# Patient Record
Sex: MALE | Race: WHITE | Employment: OTHER | URBAN - NONMETROPOLITAN AREA
[De-identification: names, ages, dates, MRNs, and addresses within clinical notes are randomized per-mention and may not be internally consistent; named-entity substitution may affect disease eponyms.]

---

## 2024-07-17 ENCOUNTER — APPOINTMENT (OUTPATIENT)
Dept: CARDIOLOGY | Facility: CLINIC | Age: 64
End: 2024-07-17
Payer: COMMERCIAL

## 2024-07-17 VITALS
WEIGHT: 204 LBS | HEART RATE: 69 BPM | HEIGHT: 69 IN | DIASTOLIC BLOOD PRESSURE: 62 MMHG | BODY MASS INDEX: 30.21 KG/M2 | SYSTOLIC BLOOD PRESSURE: 120 MMHG

## 2024-07-17 DIAGNOSIS — E11.9 TYPE 2 DIABETES MELLITUS WITHOUT COMPLICATION, UNSPECIFIED WHETHER LONG TERM INSULIN USE (MULTI): ICD-10-CM

## 2024-07-17 DIAGNOSIS — C34.90 MALIGNANT NEOPLASM OF LUNG, UNSPECIFIED LATERALITY, UNSPECIFIED PART OF LUNG (MULTI): ICD-10-CM

## 2024-07-17 DIAGNOSIS — J44.9 CHRONIC OBSTRUCTIVE PULMONARY DISEASE, UNSPECIFIED COPD TYPE (MULTI): ICD-10-CM

## 2024-07-17 DIAGNOSIS — I48.0 PAROXYSMAL ATRIAL FIBRILLATION (MULTI): ICD-10-CM

## 2024-07-17 DIAGNOSIS — R06.09 DYSPNEA ON EXERTION: Primary | ICD-10-CM

## 2024-07-17 DIAGNOSIS — Z87.891 FORMER SMOKER: ICD-10-CM

## 2024-07-17 DIAGNOSIS — I10 PRIMARY HYPERTENSION: ICD-10-CM

## 2024-07-17 PROCEDURE — 3078F DIAST BP <80 MM HG: CPT | Performed by: INTERNAL MEDICINE

## 2024-07-17 PROCEDURE — 4010F ACE/ARB THERAPY RXD/TAKEN: CPT | Performed by: INTERNAL MEDICINE

## 2024-07-17 PROCEDURE — 3008F BODY MASS INDEX DOCD: CPT | Performed by: INTERNAL MEDICINE

## 2024-07-17 PROCEDURE — 99204 OFFICE O/P NEW MOD 45 MIN: CPT | Performed by: INTERNAL MEDICINE

## 2024-07-17 PROCEDURE — 93000 ELECTROCARDIOGRAM COMPLETE: CPT | Performed by: INTERNAL MEDICINE

## 2024-07-17 PROCEDURE — 3074F SYST BP LT 130 MM HG: CPT | Performed by: INTERNAL MEDICINE

## 2024-07-17 RX ORDER — LEVOTHYROXINE SODIUM 25 UG/1
25 TABLET ORAL DAILY
COMMUNITY

## 2024-07-17 RX ORDER — METFORMIN HYDROCHLORIDE 500 MG/1
2 TABLET ORAL
COMMUNITY

## 2024-07-17 RX ORDER — LISINOPRIL 10 MG/1
10 TABLET ORAL DAILY
COMMUNITY

## 2024-07-17 RX ORDER — MECLIZINE HYDROCHLORIDE 25 MG/1
25 TABLET ORAL 3 TIMES DAILY PRN
COMMUNITY

## 2024-07-17 RX ORDER — BISOPROLOL FUMARATE 5 MG/1
TABLET, FILM COATED ORAL DAILY
COMMUNITY

## 2024-07-17 ASSESSMENT — ENCOUNTER SYMPTOMS: SHORTNESS OF BREATH: 1

## 2024-07-17 NOTE — LETTER
"July 17, 2024     Dee Torres, APRN-CNP  [address] [facility] [address]    Patient: Rory Almazan   YOB: 1960   Date of Visit: 7/17/2024       Dear Dr. Dee Torres, APRN-CNP:    Thank you for referring Rory Almazan to me for evaluation. Below are my notes for this consultation.  If you have questions, please do not hesitate to call me. I look forward to following your patient along with you.       Sincerely,     Waqas Trujillo MD      CC: No Recipients  ______________________________________________________________________________________    Cardiology Consultation- New Consult    Reason for referral: Cardiac consultation requested for evaluation for history of paroxysmal atrial fibrillation and cardiomyopathy    HPI: Rory Almazan is a 63 y.o. male self schedule this appointment for evaluation for paroxysmal atrial fibrillation and \"weak heart muscle\".  Patient report long history of COPD.  He reports he underwent partial pneumonectomy for lung cancer and received chemotherapy.  Following the surgery he did go to atrial fibrillation which was treated and resolved.  He was on medication for few years but none over the last 2 to 3 years.  Also reports he was told that he may have had a weak heart muscle.  None of this is available to me to review.  He was seen by our group remotely he underwent cardiac catheterization in the early 2000 which showed only mild atherosclerotic coronary artery disease.  Patient reports symptoms of fatigue, tiredness and shortness of breath.  He admits to decreased exercise tolerance above-knee amputation of the right extremity.  He denies chest pain.  He report history of hypertension, diabetes mellitus but does not recall what his lipid profile.    Assessment    1.  Dyspnea on exertion and patient with multiple risk factor for ischemic heart disease.  Contributing element previous history of " tobacco use, COPD and partial resection of his lung  2.  History of paroxysmal atrial fibrillation currently in sinus rhythm not on anticoagulation for that reason unclear to me but no recurrence over the last few years  3.  Reported history of cardiomyopathy does not recall the details  4.  No coronary artery disease based on remote heart cath  5.  Hypertension controlled  6.  Obesity  7.  Reformed smoker  8.  Diabetes mellitus  9.  Lipid status is unknown  10.  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  Considering the patient age, risk factors, inability to exercise due to above knee amputation and considering his symptoms and risk factors I believe it is prudent to proceed with Lexiscan myocardial fusion study and echocardiogram  2.  Continue with aggressive approach risk factor modification  3.  I advised the patient to start aspirin 81 mg daily  4.  If he had any recurrence of his atrial fibrillation would consider DOACs  5.      Past Medical History:   He has no past medical history on file.    Surgical History:   He has no past surgical history on file.    Family History:   No family history on file.    Social History:   Social History     Tobacco Use   • Smoking status: Former     Types: Cigarettes   • Smokeless tobacco: Current   • Tobacco comments:     vape   Substance Use Topics   • Alcohol use: Not Currently        Allergies:  Codeine     Current Medications:    Current Outpatient Medications:   •  bisoprolol (Zebeta) 5 mg tablet, Take by mouth once daily., Disp: , Rfl:   •  levothyroxine (Synthroid, Levoxyl) 25 mcg tablet, Take 1 tablet (25 mcg) by mouth early in the morning.. Take on an empty stomach at the same time each day, either 30 to 60 minutes prior to breakfast, Disp: , Rfl:   •  lisinopril 10 mg tablet, Take 1 tablet (10 mg) by mouth once daily., Disp: , Rfl:   •  meclizine (Antivert) 25 mg tablet, Take 1 tablet (25 mg) by mouth 3 times a day as needed for dizziness., Disp: ,  "Rfl:   •  metFORMIN (Glucophage) 500 mg tablet, Take 2 tablets (1,000 mg) by mouth 2 times daily (morning and late afternoon)., Disp: , Rfl:      Vitals:  Vitals:    07/17/24 1400   BP: 120/62   BP Location: Left arm   Patient Position: Sitting   Pulse: 69   Weight: 92.5 kg (204 lb)   Height: 1.753 m (5' 9\")      EKG done in office today      Review of Systems   Respiratory:  Positive for shortness of breath.    All other systems reviewed and are negative.      Objective        Physical Exam  Constitutional:       Appearance: Normal appearance.   HENT:      Nose: Nose normal.   Neck:      Vascular: No carotid bruit.   Cardiovascular:      Rate and Rhythm: Normal rate.      Pulses: Normal pulses.      Heart sounds: Normal heart sounds.      Comments: Right leg BKA  Pulmonary:      Effort: Pulmonary effort is normal.   Abdominal:      General: Bowel sounds are normal.      Palpations: Abdomen is soft.   Musculoskeletal:         General: Normal range of motion.      Cervical back: Normal range of motion.      Right lower leg: No edema.      Left lower leg: No edema.   Skin:     General: Skin is warm and dry.   Neurological:      General: No focal deficit present.      Mental Status: He is alert.   Psychiatric:         Mood and Affect: Mood normal.         Behavior: Behavior normal.         Thought Content: Thought content normal.         Judgment: Judgment normal.                Assessment and Plan:   1. Dyspnea on exertion  Transthoracic Echo Complete    Nuclear Stress Test      2. Paroxysmal atrial fibrillation (Multi)  ECG 12 Lead      3. Primary hypertension  Lipid Panel    Follow Up In Cardiology    Lipid Panel      4. Type 2 diabetes mellitus without complication, unspecified whether long term insulin use (Multi)  Lipid Panel    Lipid Panel      5. Chronic obstructive pulmonary disease, unspecified COPD type (Multi)        6. BMI 30.0-30.9,adult        7. Former smoker        8. Malignant neoplasm of lung, " unspecified laterality, unspecified part of lung (Multi)               Scribe Attestation  By signing my name below, Jammie PETIT LPN, Scribe   attest that this documentation has been prepared under the direction and in the presence of Waqas Trujillo MD.    Provider Attestation - Scribe documentation    All medical record entries made by the Scribe were at my direction and personally dictated by me. I have reviewed the chart and agree that the record accurately reflects my personal performance of the history, physical exam, discussion and plan.

## 2024-07-17 NOTE — PROGRESS NOTES
"Cardiology Consultation- New Consult    Reason for referral: Cardiac consultation requested for evaluation for history of paroxysmal atrial fibrillation and cardiomyopathy    HPI: Rory Almazan is a 63 y.o. male self schedule this appointment for evaluation for paroxysmal atrial fibrillation and \"weak heart muscle\".  Patient report long history of COPD.  He reports he underwent partial pneumonectomy for lung cancer and received chemotherapy.  Following the surgery he did go to atrial fibrillation which was treated and resolved.  He was on medication for few years but none over the last 2 to 3 years.  Also reports he was told that he may have had a weak heart muscle.  None of this is available to me to review.  He was seen by our group remotely he underwent cardiac catheterization in the early 2000 which showed only mild atherosclerotic coronary artery disease.  Patient reports symptoms of fatigue, tiredness and shortness of breath.  He admits to decreased exercise tolerance above-knee amputation of the right extremity.  He denies chest pain.  He report history of hypertension, diabetes mellitus but does not recall what his lipid profile.    Assessment    1.  Dyspnea on exertion and patient with multiple risk factor for ischemic heart disease.  Contributing element previous history of tobacco use, COPD and partial resection of his lung  2.  History of paroxysmal atrial fibrillation currently in sinus rhythm not on anticoagulation for that reason unclear to me but no recurrence over the last few years  3.  Reported history of cardiomyopathy does not recall the details  4.  No coronary artery disease based on remote heart cath  5.  Hypertension controlled  6.  Obesity  7.  Reformed smoker  8.  Diabetes mellitus  9.  Lipid status is unknown  10.  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  Considering the patient age, risk factors, inability to exercise due to above knee amputation and considering his " "symptoms and risk factors I believe it is prudent to proceed with Lexiscan myocardial fusion study and echocardiogram  2.  Continue with aggressive approach risk factor modification  3.  I advised the patient to start aspirin 81 mg daily  4.  If he had any recurrence of his atrial fibrillation would consider DOACs  5.      Past Medical History:   He has no past medical history on file.    Surgical History:   He has no past surgical history on file.    Family History:   No family history on file.    Social History:   Social History     Tobacco Use    Smoking status: Former     Types: Cigarettes    Smokeless tobacco: Current    Tobacco comments:     vape   Substance Use Topics    Alcohol use: Not Currently        Allergies:  Codeine     Current Medications:    Current Outpatient Medications:     bisoprolol (Zebeta) 5 mg tablet, Take by mouth once daily., Disp: , Rfl:     levothyroxine (Synthroid, Levoxyl) 25 mcg tablet, Take 1 tablet (25 mcg) by mouth early in the morning.. Take on an empty stomach at the same time each day, either 30 to 60 minutes prior to breakfast, Disp: , Rfl:     lisinopril 10 mg tablet, Take 1 tablet (10 mg) by mouth once daily., Disp: , Rfl:     meclizine (Antivert) 25 mg tablet, Take 1 tablet (25 mg) by mouth 3 times a day as needed for dizziness., Disp: , Rfl:     metFORMIN (Glucophage) 500 mg tablet, Take 2 tablets (1,000 mg) by mouth 2 times daily (morning and late afternoon)., Disp: , Rfl:      Vitals:  Vitals:    07/17/24 1400   BP: 120/62   BP Location: Left arm   Patient Position: Sitting   Pulse: 69   Weight: 92.5 kg (204 lb)   Height: 1.753 m (5' 9\")      EKG done in office today      Review of Systems   Respiratory:  Positive for shortness of breath.    All other systems reviewed and are negative.      Objective         Physical Exam  Constitutional:       Appearance: Normal appearance.   HENT:      Nose: Nose normal.   Neck:      Vascular: No carotid bruit.   Cardiovascular:      " Rate and Rhythm: Normal rate.      Pulses: Normal pulses.      Heart sounds: Normal heart sounds.      Comments: Right leg BKA  Pulmonary:      Effort: Pulmonary effort is normal.   Abdominal:      General: Bowel sounds are normal.      Palpations: Abdomen is soft.   Musculoskeletal:         General: Normal range of motion.      Cervical back: Normal range of motion.      Right lower leg: No edema.      Left lower leg: No edema.   Skin:     General: Skin is warm and dry.   Neurological:      General: No focal deficit present.      Mental Status: He is alert.   Psychiatric:         Mood and Affect: Mood normal.         Behavior: Behavior normal.         Thought Content: Thought content normal.         Judgment: Judgment normal.                Assessment and Plan:   1. Dyspnea on exertion  Transthoracic Echo Complete    Nuclear Stress Test      2. Paroxysmal atrial fibrillation (Multi)  ECG 12 Lead      3. Primary hypertension  Lipid Panel    Follow Up In Cardiology    Lipid Panel      4. Type 2 diabetes mellitus without complication, unspecified whether long term insulin use (Multi)  Lipid Panel    Lipid Panel      5. Chronic obstructive pulmonary disease, unspecified COPD type (Multi)        6. BMI 30.0-30.9,adult        7. Former smoker        8. Malignant neoplasm of lung, unspecified laterality, unspecified part of lung (Multi)               Scribe Attestation  By signing my name below, Jammie PETIT LPN, Scribe   attest that this documentation has been prepared under the direction and in the presence of Waqas Trujillo MD.    Provider Attestation - Scribe documentation    All medical record entries made by the Scribe were at my direction and personally dictated by me. I have reviewed the chart and agree that the record accurately reflects my personal performance of the history, physical exam, discussion and plan.

## 2024-07-17 NOTE — PATIENT INSTRUCTIONS
Please bring all medicines, vitamins, and herbal supplements with you when you come to the office.    Prescriptions will not be filled unless you are compliant with your follow up appointments or have a follow up appointment scheduled as per instruction of your physician. Refills should be requested at the time of your visit.     BMI was above normal measurement. Current weight: 92.5 kg (204 lb)  Weight change since last visit (-) denotes wt loss 204 lbs   Weight loss needed to achieve BMI 25: 35.1 Lbs  Weight loss needed to achieve BMI 30: 1.3 Lbs  Provided instructions on dietary changes  Provided instructions on exercise.    [facility]  Echo  Lipid  Ov results.

## 2024-07-17 NOTE — PROGRESS NOTES
"Osvaldo Almazan is a 63 y.o. male       Chief Complaint    Establish Care          HPI     Review of Systems   Respiratory:  Positive for shortness of breath.    All other systems reviewed and are negative.           Vitals:    07/17/24 1400   BP: 120/62   BP Location: Left arm   Patient Position: Sitting   Pulse: 69   Weight: 92.5 kg (204 lb)   Height: 1.753 m (5' 9\")    EKG done in office today      Objective   Physical Exam    Allergies  Codeine     Current Medications    Current Outpatient Medications:     bisoprolol (Zebeta) 5 mg tablet, Take by mouth once daily., Disp: , Rfl:     levothyroxine (Synthroid, Levoxyl) 25 mcg tablet, Take 1 tablet (25 mcg) by mouth early in the morning.. Take on an empty stomach at the same time each day, either 30 to 60 minutes prior to breakfast, Disp: , Rfl:     lisinopril 10 mg tablet, Take 1 tablet (10 mg) by mouth once daily., Disp: , Rfl:     meclizine (Antivert) 25 mg tablet, Take 1 tablet (25 mg) by mouth 3 times a day as needed for dizziness., Disp: , Rfl:     metFORMIN (Glucophage) 500 mg tablet, Take 2 tablets (1,000 mg) by mouth 2 times daily (morning and late afternoon)., Disp: , Rfl:                      Assessment/Plan   No diagnosis found.         Scribe Attestation  By signing my name below, I, *** , Scribe   attest that this documentation has been prepared under the direction and in the presence of Waqas Trujillo MD.     Provider Attestation - Scribe documentation    All medical record entries made by the Scribe were at my direction and personally dictated by me. I have reviewed the chart and agree that the record accurately reflects my personal performance of the history, physical exam, discussion and plan.     "

## 2024-08-14 ENCOUNTER — HOSPITAL ENCOUNTER (OUTPATIENT)
Dept: CARDIOLOGY | Facility: CLINIC | Age: 64
Discharge: HOME | End: 2024-08-14
Payer: COMMERCIAL

## 2024-08-14 ENCOUNTER — HOSPITAL ENCOUNTER (OUTPATIENT)
Dept: RADIOLOGY | Facility: CLINIC | Age: 64
Discharge: HOME | End: 2024-08-14
Payer: COMMERCIAL

## 2024-08-14 VITALS
BODY MASS INDEX: 30.21 KG/M2 | DIASTOLIC BLOOD PRESSURE: 64 MMHG | HEIGHT: 69 IN | SYSTOLIC BLOOD PRESSURE: 124 MMHG | WEIGHT: 204 LBS

## 2024-08-14 VITALS — HEART RATE: 58 BPM | SYSTOLIC BLOOD PRESSURE: 144 MMHG | DIASTOLIC BLOOD PRESSURE: 86 MMHG

## 2024-08-14 DIAGNOSIS — R06.09 DYSPNEA ON EXERTION: ICD-10-CM

## 2024-08-14 PROCEDURE — 3430000001 HC RX 343 DIAGNOSTIC RADIOPHARMACEUTICALS: Performed by: INTERNAL MEDICINE

## 2024-08-14 PROCEDURE — A9502 TC99M TETROFOSMIN: HCPCS | Performed by: INTERNAL MEDICINE

## 2024-08-14 PROCEDURE — 93306 TTE W/DOPPLER COMPLETE: CPT

## 2024-08-14 PROCEDURE — 2500000004 HC RX 250 GENERAL PHARMACY W/ HCPCS (ALT 636 FOR OP/ED): Performed by: INTERNAL MEDICINE

## 2024-08-14 PROCEDURE — 78452 HT MUSCLE IMAGE SPECT MULT: CPT

## 2024-08-14 PROCEDURE — 93017 CV STRESS TEST TRACING ONLY: CPT

## 2024-08-14 PROCEDURE — 93306 TTE W/DOPPLER COMPLETE: CPT | Performed by: INTERNAL MEDICINE

## 2024-08-14 RX ORDER — REGADENOSON 0.08 MG/ML
0.4 INJECTION, SOLUTION INTRAVENOUS ONCE
Status: COMPLETED | OUTPATIENT
Start: 2024-08-14 | End: 2024-08-14

## 2024-08-15 ENCOUNTER — TELEPHONE (OUTPATIENT)
Dept: CARDIOLOGY | Facility: CLINIC | Age: 64
End: 2024-08-15
Payer: COMMERCIAL

## 2024-08-15 LAB
AORTIC VALVE MEAN GRADIENT: 2 MMHG
AORTIC VALVE PEAK VELOCITY: 0.96 M/S
AV PEAK GRADIENT: 3.7 MMHG
AVA (PEAK VEL): 2.75 CM2
AVA (VTI): 2.86 CM2
EJECTION FRACTION APICAL 4 CHAMBER: 50.9
EJECTION FRACTION: 53 %
LEFT VENTRICLE INTERNAL DIMENSION DIASTOLE: 5.04 CM (ref 3.5–6)
LEFT VENTRICULAR OUTFLOW TRACT DIAMETER: 2.5 CM
LV EJECTION FRACTION BIPLANE: 47 %
MITRAL VALVE E/A RATIO: 1.8
MITRAL VALVE E/E' RATIO: 15.5

## 2024-08-15 NOTE — TELEPHONE ENCOUNTER
Result Communication    Resulted Orders   Transthoracic Echo Complete   Result Value Ref Range    AV mn grad 2.0 mmHg    AV pk xu 0.96 m/s    LV Biplane EF 47 %    LVOT diam 2.50 cm    MV E/A ratio 1.80     MV avg E/e' ratio 15.50     LV EF 53 %    LVIDd 5.04 cm    Aortic Valve Area by Continuity of Peak Velocity 2.75 cm2    AV pk grad 3.7 mmHg    Aortic Valve Area by Continuity of VTI 2.86 cm2    LV A4C EF 50.9     Narrative                   [facility]  [address] [place] [address], [address] [address]          Tel [phone] Fax [phone]    TRANSTHORACIC ECHOCARDIOGRAM REPORT    Patient Name:      NAY BARAHONA       Reading Physician:    [id number] Trujillo MD  Study Date:        8/14/2024             Ordering Provider:    Hazel TRUJILLO  MRN/PID:           [id number]              Fellow:  Accession#:        [id number]          Nurse:  Date of Birth/Age: 1960 / 63 years Sonographer:          Sowmya Delcid RDCS, RVT  Gender:            M                     Additional Staff:  Height:            175.26 cm             Admit Date:  Weight:            92.53 kg              Admission Status:  BSA / BMI:         2.08 m2 / 30.13 kg/m2 Department Location:  [facility]  Blood Pressure: 124 /64 mmHg    Study Type:    TRANSTHORACIC ECHO (TTE) COMPLETE  Diagnosis/ICD: Other forms of dyspnea-R06.09  Indication:    Paroxysmal Atrial Fibrillation, COPD, Diabetes, HTN, Former                 Smoker, Vape Use, Lung Cancer-Partial Pneumonectomy and                 Chemotherapy, Right Above Knee Amputee, Obesity  CPT Codes:     Echo Complete w Full Doppler-[id number]   Study Detail: The following Echo studies were performed:  2D, M-Mode, Doppler and                color flow.       PHYSICIAN INTERPRETATION:  Left Ventricle: The left ventricular systolic function is low normal, with a visually estimated ejection fraction of 50-55%. The left ventricular cavity size is normal. Spectral Doppler shows a restrictive pattern of left ventricular diastolic filling. Mild LVH.  Left Atrium: The left atrium is mildly dilated. Mildly dilated left atrium.  Right Ventricle: The right ventricle is normal in size. There is normal right ventricular global systolic function.  Right Atrium: The right atrium is normal in size.  Aortic Valve: The aortic valve appears structurally normal. The aortic valve dimensionless index is 0.58. There is no evidence of aortic valve regurgitation. The peak instantaneous gradient of the aortic valve is 3.7 mmHg. The mean gradient of the aortic valve is 2.0 mmHg.  Mitral Valve: The mitral valve is mildly thickened. There is mild to moderate mitral valve regurgitation.  Tricuspid Valve: The tricuspid valve is structurally normal. No evidence of tricuspid regurgitation.  Pulmonic Valve: The pulmonic valve is structurally normal. There is no indication of pulmonic valve regurgitation.  Pericardium: There is no pericardial effusion noted.  Aorta: The aortic root is normal.       CONCLUSIONS:   1. The left ventricular systolic function is low normal, with a visually estimated ejection fraction of 50-55%.   2. Spectral Doppler shows a restrictive pattern of left ventricular diastolic filling.   3. There is normal right ventricular global systolic function.   4. Mildly dilated left atrium.   5. Mild to moderate mitral valve regurgitation.   6. No previous study available for comparison.    QUANTITATIVE DATA SUMMARY:  2D MEASUREMENTS:                            Normal Ranges:  Ao Root d:     3.60 cm    (2.0-3.7cm)  LAs:           4.80 cm    (2.7-4.0cm)  RVIDd:         3.53 cm    (0.9-3.6cm)  IVSd:          1.39 cm     (0.6-1.1cm)  LVPWd:         1.17 cm    (0.6-1.1cm)  LVIDd:         5.04 cm    (3.9-5.9cm)  LVIDs:         3.88 cm  LV Mass Index: 124.5 g/m2  LV % FS        23.0 %    LV SYSTOLIC FUNCTION BY 2D PLANIMETRY (MOD):                       Normal Ranges:  EF-A4C View:    51 % (>=55%)  EF-A2C View:    46 %  EF-Biplane:     47 %  EF-Visual:      53 %  LV EF Reported: 53 %    LV DIASTOLIC FUNCTION:                          Normal Ranges:  MV Peak E:    0.92 m/s  (0.7-1.2 m/s)  MV Peak A:    0.51 m/s  (0.42-0.7 m/s)  E/A Ratio:    1.80      (1.0-2.2)  MV e'         0.054 m/s (>8.0)  MV lateral e' 0.06 m/s  MV medial e'  0.05 m/s  E/e' Ratio:   17.19     (<8.0)    MITRAL VALVE:                       Normal Ranges:  MV Vmax:    1.13 m/s (<=1.3m/s)  MV peak P.1 mmHg (<5mmHg)  MV mean P.0 mmHg (<48mmHg)    MITRAL INSUFFICIENCY:                       Normal Ranges:  MR Vmax: 452.00 cm/s  dP/dt:   1010 mmHg/s (>1200mmHg/sec)    AORTIC VALVE:                                    Normal Ranges:  AoV Vmax:                0.96 m/s (<=1.7m/s)  AoV Peak PG:             3.7 mmHg (<20mmHg)  AoV Mean P.0 mmHg (1.7-11.5mmHg)  LVOT Max Diego:            0.54 m/s (<=1.1m/s)  AoV VTI:                 24.00 cm (18-25cm)  LVOT VTI:                14.00 cm  LVOT Diameter:           2.50 cm  (1.8-2.4cm)  AoV Area, VTI:           2.86 cm2 (2.5-5.5cm2)  AoV Area,Vmax:           2.75 cm2 (2.5-4.5cm2)  AoV Dimensionless Index: 0.58    PULMONIC VALVE:                        Normal Ranges:  PV Max Diego: 0.5 m/s   (0.6-0.9m/s)  PV Max P.0 mmHg  PIEDV:      1.65 m/s  PADP:       13.9 mmHg       [id number] Waqas rTujillo MD  Electronically signed on 8/15/2024 at 11:41:45 AM         ** Final **

## 2024-08-15 NOTE — TELEPHONE ENCOUNTER
Attempted to phone patient, unable to reach at this time and unable to leave voicemail with results.

## 2024-08-15 NOTE — TELEPHONE ENCOUNTER
Result Communication    Resulted Orders   Nuclear Stress Test    Narrative    Interpreted By:  Ruiz Polanco and Giannuzzi Michael   STUDY:  MYOCARDIAL PERFUSION STRESS TEST WITH LEXISCAN      Performing facility:  [facility],  [address] [place] [address], [address],  [place] [address]  [facility] Provider:  Waqas Trujillo MD  PCP:  Dr. TANG Torres CNP  Supervising provider:  SOPHIA Reyes DO, [facility]      INDICATION:  HUNTER;      HISTORY:  Gender:  M; Age:  64 y/o ; Height:  .3 cm cm; Weight:   WT  92.534 kg kg.      Diabetes;  HTN;  A-fib  SOB;  COPD;  Throat cancer  Left lung cancer  partial pneumectomy Quit smoking unknown years ago.      Cardiac catheterization on 2007.      COMPARISON:  [place] [facility] [place]      ACCESSION NUMBER(S):  [id number]      ORDERING CLINICIAN:  WAQAS TRUJILLO      TECHNIQUE:  ONE DAY protocol.  Stress injection: Date:8-14-24, 33.3 mCi of Myoview IV 20 seconds  after rapid injection of Lexiscan. Rest injection: Date: 8-14-24,  10.5 mCi of Myoview IV at rest. The patient had a rapid injection of  0.4 mg of Lexiscan IV over 10 seconds. Imaging was performed by  gated tomographic technique. Reason for Lexiscan: Above knee  amputation      STRESS TEST DATA:  Resting heart rate was 58 BPM.  Resting blood pressure was 144/86 mmHg.  Peak blood pressure was 132/76 mmHg.  Peak heart rate was 73 BPM.      TEST TERMINATED DUE TO:  Protocol completed      FINDINGS:  STRESS TEST RESULTS:      Resting electrocardiogram revealed ectopic atrial bradycardia with  short AK. There were no significant ischemic ECG changes or  dysrhythmias. The patient did not have chest pains/symptoms during  procedure. There was a normal recovery phase.      IMAGING RESULTS:      Image quality was good.  Rest and stress tomographic images were reviewed and revealed normal  perfusion without evidence of ischemia, myocardial infarction, or  left ventricular  dilatation with stress. Overall left ventricular  systolic function appeared to be normal without regional wall motion  abnormalities. Ejection fraction was 50%.  TID is 1.02 and is normal.  There was no evidence of  attenuation artifact.        Impression    Normal Lexiscan Myoview cardiac perfusion stress test.  No evidence of ischemia or myocardial infarction by perfusion imaging.  Normal left ventricular systolic function, ejection fraction 50%.  When compared to a study from 2007 from another institution, the  previous study reported probable small area of inferior myocardial  ischemia which is no longer seen.      Signed by: Ruiz Polanco 8/14/2024 12:09 PM  Dictation workstation:   [id number]       9:42 AM      Results were successfully communicated with the patient and they acknowledged their understanding.

## 2024-08-15 NOTE — TELEPHONE ENCOUNTER
----- Message from Waqas Trujillo sent at 8/15/2024 11:52 AM EDT -----  Advise echo looks good  ----- Message -----  From: Lisseth, Syngo - Cardiology Results In  Sent: 8/15/2024  11:41 AM EDT  To: Waqas Trujillo MD

## 2024-08-15 NOTE — TELEPHONE ENCOUNTER
----- Message from Waqas Trujillo sent at 8/15/2024 12:00 AM EDT -----  Advise stress test is normal  ----- Message -----  From: Interface, Radiology Results In  Sent: 8/14/2024  12:10 PM EDT  To: Waqas Trujillo MD

## 2024-08-27 ENCOUNTER — APPOINTMENT (OUTPATIENT)
Dept: CARDIOLOGY | Facility: CLINIC | Age: 64
End: 2024-08-27
Payer: COMMERCIAL

## 2024-08-27 VITALS
HEART RATE: 68 BPM | BODY MASS INDEX: 29.92 KG/M2 | SYSTOLIC BLOOD PRESSURE: 126 MMHG | DIASTOLIC BLOOD PRESSURE: 62 MMHG | HEIGHT: 69 IN | WEIGHT: 202 LBS

## 2024-08-27 DIAGNOSIS — I48.0 PAROXYSMAL ATRIAL FIBRILLATION (MULTI): ICD-10-CM

## 2024-08-27 DIAGNOSIS — R06.09 DYSPNEA ON EXERTION: Primary | ICD-10-CM

## 2024-08-27 DIAGNOSIS — Z87.891 FORMER SMOKER: ICD-10-CM

## 2024-08-27 DIAGNOSIS — J44.9 CHRONIC OBSTRUCTIVE PULMONARY DISEASE, UNSPECIFIED COPD TYPE (MULTI): ICD-10-CM

## 2024-08-27 DIAGNOSIS — C34.90 MALIGNANT NEOPLASM OF LUNG, UNSPECIFIED LATERALITY, UNSPECIFIED PART OF LUNG (MULTI): ICD-10-CM

## 2024-08-27 DIAGNOSIS — I10 PRIMARY HYPERTENSION: ICD-10-CM

## 2024-08-27 PROCEDURE — 99214 OFFICE O/P EST MOD 30 MIN: CPT | Performed by: INTERNAL MEDICINE

## 2024-08-27 PROCEDURE — 3078F DIAST BP <80 MM HG: CPT | Performed by: INTERNAL MEDICINE

## 2024-08-27 PROCEDURE — 3074F SYST BP LT 130 MM HG: CPT | Performed by: INTERNAL MEDICINE

## 2024-08-27 PROCEDURE — 4010F ACE/ARB THERAPY RXD/TAKEN: CPT | Performed by: INTERNAL MEDICINE

## 2024-08-27 PROCEDURE — 3008F BODY MASS INDEX DOCD: CPT | Performed by: INTERNAL MEDICINE

## 2024-08-27 NOTE — PATIENT INSTRUCTIONS
Please bring all medicines, vitamins, and herbal supplements with you when you come to the office.    Prescriptions will not be filled unless you are compliant with your follow up appointments or have a follow up appointment scheduled as per instruction of your physician. Refills should be requested at the time of your visit.     BMI was above normal measurement. Current weight: 91.6 kg (202 lb)  Weight change since last visit (-) denotes wt loss -2 lbs   Weight loss needed to achieve BMI 25: 33.1 Lbs  Weight loss needed to achieve BMI 30: -0.7 Lbs  Provided instructions on dietary changes.

## 2024-08-27 NOTE — PROGRESS NOTES
Subjective   Rory Almazan is a 63 y.o. male       Chief Complaint    Follow-up          HPI   Patient is here for follow-up and management for recent evaluation for shortness of breath, remote history of paroxysmal atrial fibrillation, questionable cardiomyopathy and hypertension.  He underwent evaluation.  His myocardial perfusion study was normal.  His stress test showed LVEF around 50 to 55% with minimal mitral regurgitation.  Patient history of lung cancer and prior history of tobacco use and COPD.  The results of his testing reviewed with him at length.    Assessment     1.  Dyspnea on exertion and patient with multiple risk factor for ischemic heart disease.  Contributing element previous history of tobacco use, COPD and partial resection of his lung his recent cardiac workup appears reassuring  2.  History of paroxysmal atrial fibrillation currently in sinus rhythm not on anticoagulation for that reason unclear to me but no recurrence over the last few years  3.  Reported history of cardiomyopathy does not recall the details recent echo showed LVEF around 50-55%  4.  No coronary artery disease based on remote heart cath.  Recent stress test was negative  5.  Hypertension controlled  6.  Obesity  7.  Reformed smoker  8.  Diabetes mellitus  9.  Lipid status is unknown  10.  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  I reviewed the result of his stress test and echocardiogram and I recommended observant approach  2.  Continue with aggressive approach risk factor modification  3.  I advised the patient to continue aspirin  4.  If he had any recurrence of his atrial fibrillation would consider DOACs  5.  Advised him to follow-up in 6 months and advised him to notify me change in cardiac status or symptoms  Review of Systems   All other systems reviewed and are negative.           Vitals:    08/27/24 1411   BP: 126/62   BP Location: Left arm   Patient Position: Sitting   Pulse: 68   Weight: 91.6  "kg (202 lb)   Height: 1.753 m (5' 9\")        Objective   Physical Exam  Constitutional:       Appearance: Normal appearance.   HENT:      Nose: Nose normal.   Neck:      Vascular: No carotid bruit.   Cardiovascular:      Rate and Rhythm: Normal rate.      Pulses: Normal pulses.      Heart sounds: Normal heart sounds.   Pulmonary:      Effort: Pulmonary effort is normal.   Abdominal:      General: Bowel sounds are normal.      Palpations: Abdomen is soft.   Musculoskeletal:         General: Normal range of motion.      Cervical back: Normal range of motion.      Right lower leg: No edema.      Left lower leg: No edema.      Comments: Right below-knee amputation   Feet:      Comments: Right BKA  Skin:     General: Skin is warm and dry.   Neurological:      General: No focal deficit present.      Mental Status: He is alert.   Psychiatric:         Mood and Affect: Mood normal.         Behavior: Behavior normal.         Thought Content: Thought content normal.         Judgment: Judgment normal.         Allergies  Codeine     Current Medications    Current Outpatient Medications:     bisoprolol (Zebeta) 5 mg tablet, Take by mouth once daily., Disp: , Rfl:     levothyroxine (Synthroid, Levoxyl) 25 mcg tablet, Take 1 tablet (25 mcg) by mouth early in the morning.. Take on an empty stomach at the same time each day, either 30 to 60 minutes prior to breakfast, Disp: , Rfl:     lisinopril 10 mg tablet, Take 1 tablet (10 mg) by mouth once daily., Disp: , Rfl:     meclizine (Antivert) 25 mg tablet, Take 1 tablet (25 mg) by mouth 3 times a day as needed for dizziness., Disp: , Rfl:     metFORMIN (Glucophage) 500 mg tablet, Take 2 tablets (1,000 mg) by mouth 2 times daily (morning and late afternoon)., Disp: , Rfl:                      Assessment/Plan   1. Dyspnea on exertion        2. Primary hypertension  Follow Up In Cardiology      3. Paroxysmal atrial fibrillation (Multi)        4. Malignant neoplasm of lung, unspecified " laterality, unspecified part of lung (Multi)        5. Chronic obstructive pulmonary disease, unspecified COPD type (Multi)        6. Former smoker        7. BMI 29.0-29.9,adult                 Scribe Attestation  By signing my name below, Jammie PETIT LPN, Scribe   attest that this documentation has been prepared under the direction and in the presence of Waqas Trujillo MD.     Provider Attestation - Scribe documentation    All medical record entries made by the Danishaibe were at my direction and personally dictated by me. I have reviewed the chart and agree that the record accurately reflects my personal performance of the history, physical exam, discussion and plan.

## 2024-08-27 NOTE — LETTER
August 27, 2024     Dee Torres, APRN-CNP  [address] [place] [address]    Patient: Rory Almazan   YOB: 1960   Date of Visit: 8/27/2024       Dear Dr. Dee Torres, APRN-CNP:    Thank you for referring Rory Almazan to me for evaluation. Below are my notes for this consultation.  If you have questions, please do not hesitate to call me. I look forward to following your patient along with you.       Sincerely,     Waqas Trujillo MD      CC: No Recipients  ______________________________________________________________________________________    Subjective   Rory Almazan is a 63 y.o. male       Chief Complaint    Follow-up          HPI   Patient is here for follow-up and management for recent evaluation for shortness of breath, remote history of paroxysmal atrial fibrillation, questionable cardiomyopathy and hypertension.  He underwent evaluation.  His myocardial perfusion study was normal.  His stress test showed LVEF around 50 to 55% with minimal mitral regurgitation.  Patient history of lung cancer and prior history of tobacco use and COPD.  The results of his testing reviewed with him at length.    Assessment     1.  Dyspnea on exertion and patient with multiple risk factor for ischemic heart disease.  Contributing element previous history of tobacco use, COPD and partial resection of his lung his recent cardiac workup appears reassuring  2.  History of paroxysmal atrial fibrillation currently in sinus rhythm not on anticoagulation for that reason unclear to me but no recurrence over the last few years  3.  Reported history of cardiomyopathy does not recall the details recent echo showed LVEF around 50-55%  4.  No coronary artery disease based on remote heart cath.  Recent stress test was negative  5.  Hypertension controlled  6.  Obesity  7.  Reformed smoker  8.  Diabetes mellitus  9.  Lipid status is unknown  10.   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  I reviewed the result of his stress test and echocardiogram and I recommended observant approach  2.  Continue with aggressive approach risk factor modification  3.  I advised the patient to continue aspirin  4.  If he had any recurrence of his atrial fibrillation would consider DOACs  5.  Advised him to follow-up in 6 months and advised him to notify me change in cardiac status or symptoms  Review of Systems   All other systems reviewed and are negative.           Vitals:    08/27/24 1411   BP: 126/62   BP Location: Left arm   Patient Position: Sitting   Pulse: 68   Weight: 91.6 kg (202 lb)   Height: 1.753 m (5' 9\")        Objective   Physical Exam  Constitutional:       Appearance: Normal appearance.   HENT:      Nose: Nose normal.   Neck:      Vascular: No carotid bruit.   Cardiovascular:      Rate and Rhythm: Normal rate.      Pulses: Normal pulses.      Heart sounds: Normal heart sounds.   Pulmonary:      Effort: Pulmonary effort is normal.   Abdominal:      General: Bowel sounds are normal.      Palpations: Abdomen is soft.   Musculoskeletal:         General: Normal range of motion.      Cervical back: Normal range of motion.      Right lower leg: No edema.      Left lower leg: No edema.      Comments: Right below-knee amputation   Feet:      Comments: Right BKA  Skin:     General: Skin is warm and dry.   Neurological:      General: No focal deficit present.      Mental Status: He is alert.   Psychiatric:         Mood and Affect: Mood normal.         Behavior: Behavior normal.         Thought Content: Thought content normal.         Judgment: Judgment normal.         Allergies  Codeine     Current Medications    Current Outpatient Medications:   •  bisoprolol (Zebeta) 5 mg tablet, Take by mouth once daily., Disp: , Rfl:   •  levothyroxine (Synthroid, Levoxyl) 25 mcg tablet, Take 1 tablet (25 mcg) by mouth early in the morning.. Take on an empty stomach " at the same time each day, either 30 to 60 minutes prior to breakfast, Disp: , Rfl:   •  lisinopril 10 mg tablet, Take 1 tablet (10 mg) by mouth once daily., Disp: , Rfl:   •  meclizine (Antivert) 25 mg tablet, Take 1 tablet (25 mg) by mouth 3 times a day as needed for dizziness., Disp: , Rfl:   •  metFORMIN (Glucophage) 500 mg tablet, Take 2 tablets (1,000 mg) by mouth 2 times daily (morning and late afternoon)., Disp: , Rfl:                      Assessment/Plan   1. Dyspnea on exertion        2. Primary hypertension  Follow Up In Cardiology      3. Paroxysmal atrial fibrillation (Multi)        4. Malignant neoplasm of lung, unspecified laterality, unspecified part of lung (Multi)        5. Chronic obstructive pulmonary disease, unspecified COPD type (Multi)        6. Former smoker        7. BMI 29.0-29.9,adult                 Scribe Attestation  By signing my name below, Jammie PETIT LPN, Scribe   attest that this documentation has been prepared under the direction and in the presence of Waqas Trujillo MD.     Provider Attestation - Scribe documentation    All medical record entries made by the Scribe were at my direction and personally dictated by me. I have reviewed the chart and agree that the record accurately reflects my personal performance of the history, physical exam, discussion and plan.

## 2025-05-21 ENCOUNTER — APPOINTMENT (OUTPATIENT)
Dept: CARDIOLOGY | Facility: CLINIC | Age: 65
End: 2025-05-21
Payer: COMMERCIAL

## 2025-05-21 VITALS
DIASTOLIC BLOOD PRESSURE: 68 MMHG | HEIGHT: 68 IN | SYSTOLIC BLOOD PRESSURE: 120 MMHG | HEART RATE: 64 BPM | WEIGHT: 204 LBS | BODY MASS INDEX: 30.92 KG/M2

## 2025-05-21 DIAGNOSIS — J44.9 CHRONIC OBSTRUCTIVE PULMONARY DISEASE, UNSPECIFIED COPD TYPE (MULTI): ICD-10-CM

## 2025-05-21 DIAGNOSIS — I10 PRIMARY HYPERTENSION: ICD-10-CM

## 2025-05-21 DIAGNOSIS — R06.09 DYSPNEA ON EXERTION: Primary | ICD-10-CM

## 2025-05-21 DIAGNOSIS — I48.0 PAROXYSMAL ATRIAL FIBRILLATION (MULTI): ICD-10-CM

## 2025-05-21 DIAGNOSIS — E11.9 TYPE 2 DIABETES MELLITUS WITHOUT COMPLICATION, UNSPECIFIED WHETHER LONG TERM INSULIN USE: ICD-10-CM

## 2025-05-21 DIAGNOSIS — Z87.891 FORMER SMOKER: ICD-10-CM

## 2025-05-21 PROCEDURE — 4010F ACE/ARB THERAPY RXD/TAKEN: CPT | Performed by: INTERNAL MEDICINE

## 2025-05-21 PROCEDURE — 4004F PT TOBACCO SCREEN RCVD TLK: CPT | Performed by: INTERNAL MEDICINE

## 2025-05-21 PROCEDURE — 3078F DIAST BP <80 MM HG: CPT | Performed by: INTERNAL MEDICINE

## 2025-05-21 PROCEDURE — 3008F BODY MASS INDEX DOCD: CPT | Performed by: INTERNAL MEDICINE

## 2025-05-21 PROCEDURE — G2211 COMPLEX E/M VISIT ADD ON: HCPCS | Performed by: INTERNAL MEDICINE

## 2025-05-21 PROCEDURE — 3074F SYST BP LT 130 MM HG: CPT | Performed by: INTERNAL MEDICINE

## 2025-05-21 PROCEDURE — 99214 OFFICE O/P EST MOD 30 MIN: CPT | Performed by: INTERNAL MEDICINE

## 2025-05-21 RX ORDER — ASPIRIN 81 MG/1
81 TABLET ORAL DAILY
COMMUNITY

## 2026-05-21 ENCOUNTER — APPOINTMENT (OUTPATIENT)
Dept: CARDIOLOGY | Facility: CLINIC | Age: 66
End: 2026-05-21
Payer: MEDICARE